# Patient Record
Sex: FEMALE | Race: OTHER | Employment: UNEMPLOYED | URBAN - METROPOLITAN AREA
[De-identification: names, ages, dates, MRNs, and addresses within clinical notes are randomized per-mention and may not be internally consistent; named-entity substitution may affect disease eponyms.]

---

## 2022-04-18 ENCOUNTER — HOSPITAL ENCOUNTER (EMERGENCY)
Age: 32
Discharge: HOME | End: 2022-04-18
Attending: EMERGENCY MEDICINE | Admitting: EMERGENCY MEDICINE
Payer: MEDICAID

## 2022-04-18 VITALS
BODY MASS INDEX: 24.54 KG/M2 | HEART RATE: 79 BPM | HEIGHT: 65 IN | WEIGHT: 147.27 LBS | OXYGEN SATURATION: 100 % | TEMPERATURE: 98.3 F | SYSTOLIC BLOOD PRESSURE: 118 MMHG | DIASTOLIC BLOOD PRESSURE: 52 MMHG | RESPIRATION RATE: 14 BRPM

## 2022-04-18 DIAGNOSIS — N61.0 MASTITIS: Primary | ICD-10-CM

## 2022-04-18 DIAGNOSIS — N64.3 GALACTORRHEA: ICD-10-CM

## 2022-04-18 PROCEDURE — 87205 SMEAR GRAM STAIN: CPT

## 2022-04-18 PROCEDURE — 99283 EMERGENCY DEPT VISIT LOW MDM: CPT

## 2022-04-18 RX ORDER — DOXYCYCLINE HYCLATE 100 MG
100 TABLET ORAL 2 TIMES DAILY
Qty: 14 TABLET | Refills: 0 | Status: SHIPPED | OUTPATIENT
Start: 2022-04-18 | End: 2022-04-25

## 2022-04-18 NOTE — DISCHARGE INSTRUCTIONS
[facility] SYSTEMS Departments     For adult and child immunizations, family planning, TB screening, STD testing and women's health services. [facility]: [place] [phone]      [facility] 25   [address]   [address]   170 [facility]: Sheryle Combe [address] [facility] [address] [phone]      [address] [facility]          [address] [address]     For primary care services, woman and child wellness, and some clinics providing specialty care. [facility] -- [address] [facility] [phone]   411 [facility] 200 [facility] 3614 [facility] [phone]   [address] [facility] [address] [phone]   [address]  [employer] [address] [place] [address] 5850  Community  [phone]   [address] [address] [address] [phone]   [facility] 81 [facility] [phone]   [facility] [address] [place] [address] [phone]   [facility]: [facility] [address], ext [address] [facility] [address] [phone]     Dejuan [address] [facility] [address] [phone]   [facility] [address]  Community  [phone]   [employer]  [address], [address] ([email or web address]) [phone]         Sexual Health/Woman Wellness Clinics    For STD/HIV testing and treatment, pregnancy testing and services, men's health, birth control services, LGBT services, and hepatitis/HPV vaccine services. [facility] [employer] for [place] [employer] 201 [facility] 75 [facility] 1579 600 [facility] [phone]   [facility] [address], 5th floor [phone]   Pregnancy [address]  [place] [address] [phone]         Specialty Service [address]    [phone]   [place]   [phone]   Women, Infant and Children's Services: [address] [phone]       600 [facility]   [phone]   [address]   [facility]     [phone]   [address] Crisis   [address] [phone]       Local Primary Care Physicians  [facility] [phone]  MD Tadeo Pike MD Dennison Mattocks, MD [facility] Doctors [phone]  Manjit Ogden, [facility]  Abraham Zapien, MD Genella Shark, MD Jerrye Ahumada, MD [address] [phone]  MD Nena Gomez MD [address] [facility] [phone]  Renetta Denise, MD Jese Hinojosa, MD Cadence Mcmahon MD   [facility] [phone]  [facility] Hospital[facility] FERRELL, MD Temi Zuñiga, MD Bola DeutschLovelace Medical Center, NP [address] [phone]  MD Luca Silva, MD Naina Alexis, MD Min Collins, MD Shameka Couch MD   33 57 [facility]  Laron Fry MD [address] [facility] [address] [phone]  MD Lisa Marks, NP  Rafy June, MD Lillie Love MD Michaela Rudd, MD Alpha Babe, MD   1759 [facility] [phone]  MD Mercedez Martines, P  Nahid Madden, RAFAL Abdalla, MD Julia Diaz, MD Sunil Ortiz, MD ANNA GibbonsRob[facility] [phone]  MD Alexander Shannon, MD Kalpana Gonsales, MD Jolly Fletcher MD   [address] [phone]  Elinor Mcardle, MD Elizebeth Heymann, MD [facility] [phone]  MD Ally Mejias MD [address] Adriennegeoffrey [facility], [address] [facility] [phone]  MD Ana Maria Phillips, MD Lesa Rivera, MD Laura Ruffin, MD Larry Jackson, MD Bishnu Joseph, NP  Walter Zhong MD 1619 [facility]   [phone]  Tyler Pollock, MD Claude Dada, MD Elizabeth Felton MD   2102 [facility] [phone]  Lauren Berrios, MD Brink, FNP  Jaye Yeh, PA-C  Jaye Yeh, FNP  NESTOR Mendoza-MD Kathy Collazo, NP   Red Leos, DO Miscellaneous:  Agustín Patel [phone]
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
## 2022-04-23 LAB
BACTERIA SPEC CULT: NORMAL
GRAM STN SPEC: NORMAL
GRAM STN SPEC: NORMAL
SERVICE CMNT-IMP: NORMAL